# Patient Record
Sex: MALE | Race: WHITE | NOT HISPANIC OR LATINO | URBAN - METROPOLITAN AREA
[De-identification: names, ages, dates, MRNs, and addresses within clinical notes are randomized per-mention and may not be internally consistent; named-entity substitution may affect disease eponyms.]

---

## 2020-07-25 ENCOUNTER — TELEPHONE ENCOUNTER (OUTPATIENT)
Dept: URBAN - METROPOLITAN AREA CLINIC 13 | Facility: CLINIC | Age: 65
End: 2020-07-25

## 2020-07-25 RX ORDER — AMLODIPINE BESYLATE 10 MG/1
TAKE 1 TABLET DAILY AS DIRECTED TABLET ORAL
Refills: 0 | OUTPATIENT
End: 2016-05-03

## 2020-07-25 RX ORDER — POLYETHYLENE GLYCOL 3350, SODIUM CHLORIDE, SODIUM BICARBONATE AND POTASSIUM CHLORIDE WITH LEMON FLAVOR 420; 11.2; 5.72; 1.48 G/4L; G/4L; G/4L; G/4L
TAKE AS DIRECTED POWDER, FOR SOLUTION ORAL
Qty: 1 | Refills: 0 | OUTPATIENT
Start: 2016-05-03 | End: 2016-08-26

## 2020-07-25 RX ORDER — ATENOLOL AND CHLORTHALIDONE 100; 25 MG/1; MG/1
TAKE 1 TABLET DAILY TABLET ORAL
Refills: 0 | OUTPATIENT
End: 2016-05-03

## 2020-07-25 RX ORDER — POLYETHYLENE GLYCOL 3350, SODIUM CHLORIDE, SODIUM BICARBONATE AND POTASSIUM CHLORIDE WITH LEMON FLAVOR 420; 11.2; 5.72; 1.48 G/4L; G/4L; G/4L; G/4L
USE AS DIRECTED POWDER, FOR SOLUTION ORAL
Qty: 1 | Refills: 0 | OUTPATIENT
Start: 2015-07-24 | End: 2015-08-21

## 2020-07-25 RX ORDER — ASPIRIN 300 MG
TAKE 1 PACKET AS NEEDED STATES UNKNOWN STRENGTH SUPPOSITORY, RECTAL RECTAL
Refills: 0 | OUTPATIENT
Start: 2010-08-30 | End: 2015-06-15

## 2020-07-25 RX ORDER — ASPIRIN 300 MG
TAKE 1 PACKET AS NEEDED PRN SUPPOSITORY, RECTAL RECTAL
Refills: 0 | OUTPATIENT
Start: 2010-08-09 | End: 2010-08-30

## 2020-07-26 ENCOUNTER — TELEPHONE ENCOUNTER (OUTPATIENT)
Dept: URBAN - METROPOLITAN AREA CLINIC 13 | Facility: CLINIC | Age: 65
End: 2020-07-26

## 2020-07-26 RX ORDER — PRAVASTATIN SODIUM 40 MG/1
TAKE 1 TABLET DAILY AS DIRECTED TABLET ORAL
Refills: 0 | Status: ACTIVE | COMMUNITY

## 2020-07-26 RX ORDER — LISINOPRIL 20 MG/1
TAKE 2 TABLETS DAILY TABLET ORAL
Refills: 0 | Status: ACTIVE | COMMUNITY

## 2020-07-26 RX ORDER — OMEGA-3S/DHA/EPA/FISH OIL 980-1400MG
TAKE 1 CAPSULE DAILY CAPSULE,DELAYED RELEASE (ENTERIC COATED) ORAL
Refills: 0 | Status: ACTIVE | COMMUNITY

## 2020-07-26 RX ORDER — CALCIUM CARBONATE/VITAMIN D3 600 MG-10
300MG. 1 CAPSULE BY MOUTH DAILY TABLET ORAL
Refills: 0 | Status: ACTIVE | COMMUNITY

## 2020-07-26 RX ORDER — GEMFIBROZIL 600 MG/1
TAKE 1 TABLET TWICE DAILY 30 MINUTES BEFORE MEALS TABLET, FILM COATED ORAL
Refills: 0 | Status: ACTIVE | COMMUNITY

## 2022-11-23 ENCOUNTER — DASHBOARD ENCOUNTERS (OUTPATIENT)
Age: 67
End: 2022-11-23

## 2022-11-23 ENCOUNTER — OFFICE VISIT (OUTPATIENT)
Dept: URBAN - METROPOLITAN AREA CLINIC 113 | Facility: CLINIC | Age: 67
End: 2022-11-23
Payer: MEDICARE

## 2022-11-23 VITALS
BODY MASS INDEX: 33.89 KG/M2 | DIASTOLIC BLOOD PRESSURE: 76 MMHG | SYSTOLIC BLOOD PRESSURE: 129 MMHG | HEART RATE: 72 BPM | HEIGHT: 75 IN | RESPIRATION RATE: 20 BRPM | WEIGHT: 272.6 LBS | TEMPERATURE: 97.8 F

## 2022-11-23 DIAGNOSIS — L29.0 ANAL ITCH: ICD-10-CM

## 2022-11-23 DIAGNOSIS — K64.4 SKIN TAG OF ANUS: ICD-10-CM

## 2022-11-23 DIAGNOSIS — K64.1 GRADE II HEMORRHOIDS: ICD-10-CM

## 2022-11-23 PROCEDURE — 99243 OFF/OP CNSLTJ NEW/EST LOW 30: CPT | Performed by: INTERNAL MEDICINE

## 2022-11-23 RX ORDER — CALCIUM CARBONATE/VITAMIN D3 600 MG-10
300MG. 1 CAPSULE BY MOUTH DAILY TABLET ORAL
Refills: 0 | Status: ACTIVE | COMMUNITY

## 2022-11-23 RX ORDER — LISINOPRIL 20 MG/1
TAKE 2 TABLETS DAILY TABLET ORAL
Refills: 0 | Status: ACTIVE | COMMUNITY

## 2022-11-23 RX ORDER — PRAVASTATIN SODIUM 40 MG/1
TAKE 1 TABLET DAILY AS DIRECTED TABLET ORAL
Refills: 0 | Status: ACTIVE | COMMUNITY

## 2022-11-23 RX ORDER — GEMFIBROZIL 600 MG/1
TAKE 1 TABLET TWICE DAILY 30 MINUTES BEFORE MEALS TABLET, FILM COATED ORAL
Refills: 0 | Status: ACTIVE | COMMUNITY

## 2022-11-23 RX ORDER — OMEGA-3S/DHA/EPA/FISH OIL 980-1400MG
TAKE 1 CAPSULE DAILY CAPSULE,DELAYED RELEASE (ENTERIC COATED) ORAL
Refills: 0 | Status: ACTIVE | COMMUNITY

## 2022-11-23 NOTE — HPI-TODAY'S VISIT:
66-year-old male referred by Dr. Fragoso For hemorrhoids.  He was previously seen by Dr. Dangelo Garg and had a colonoscopy performed on August 26, 2016.  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  On that exam he was found have several polyps removed In pathology demonstrated mildly hyperplastic appearing ileocolonic also with reactive lymphoid aggregates.  He has not been seen since then.  Most recently he was seen by Dr. Art and referred to us urgently for rectal itching and skin tags. He had a colonoscopy done by Dr. Art recently (he is not sure when but was performed but told to come back in 10 yrs).   He has been having hemorrhoid surgeries twice in 2015 and 2019 with Dr. Art. Most recently when he had it performed he began to have rectal itching. He has tried OTC antifungal creams. This has not occurred. He has tried a STIZ bath as well but it has not worked. He has cracked/dry anus as well.